# Patient Record
Sex: MALE | Race: WHITE | HISPANIC OR LATINO | Employment: FULL TIME | URBAN - METROPOLITAN AREA
[De-identification: names, ages, dates, MRNs, and addresses within clinical notes are randomized per-mention and may not be internally consistent; named-entity substitution may affect disease eponyms.]

---

## 2023-12-21 ENCOUNTER — HOSPITAL ENCOUNTER (EMERGENCY)
Facility: HOSPITAL | Age: 43
Discharge: HOME | End: 2023-12-21
Admitting: EMERGENCY MEDICINE
Payer: COMMERCIAL

## 2023-12-21 VITALS
DIASTOLIC BLOOD PRESSURE: 83 MMHG | BODY MASS INDEX: 38.5 KG/M2 | RESPIRATION RATE: 20 BRPM | WEIGHT: 300 LBS | HEIGHT: 74 IN | HEART RATE: 89 BPM | TEMPERATURE: 97.1 F | OXYGEN SATURATION: 97 % | SYSTOLIC BLOOD PRESSURE: 165 MMHG

## 2023-12-21 LAB
ALBUMIN SERPL BCG-MCNC: 3.8 G/DL (ref 3.5–5.2)
ALP SERPL-CCNC: 104 U/L (ref 40–150)
ALT SERPL W P-5'-P-CCNC: 18 U/L (ref 0–70)
ANION GAP SERPL CALCULATED.3IONS-SCNC: 13 MMOL/L (ref 7–15)
AST SERPL W P-5'-P-CCNC: 16 U/L (ref 0–45)
BASOPHILS # BLD AUTO: 0 10E3/UL (ref 0–0.2)
BASOPHILS NFR BLD AUTO: 1 %
BILIRUB DIRECT SERPL-MCNC: <0.2 MG/DL (ref 0–0.3)
BILIRUB SERPL-MCNC: 0.4 MG/DL
BUN SERPL-MCNC: 16.1 MG/DL (ref 6–20)
CALCIUM SERPL-MCNC: 8.9 MG/DL (ref 8.6–10)
CHLORIDE SERPL-SCNC: 96 MMOL/L (ref 98–107)
CREAT SERPL-MCNC: 0.67 MG/DL (ref 0.67–1.17)
DEPRECATED HCO3 PLAS-SCNC: 27 MMOL/L (ref 22–29)
EGFRCR SERPLBLD CKD-EPI 2021: >90 ML/MIN/1.73M2
EOSINOPHIL # BLD AUTO: 0.1 10E3/UL (ref 0–0.7)
EOSINOPHIL NFR BLD AUTO: 1 %
ERYTHROCYTE [DISTWIDTH] IN BLOOD BY AUTOMATED COUNT: 12 % (ref 10–15)
GLUCOSE SERPL-MCNC: 281 MG/DL (ref 70–99)
HCT VFR BLD AUTO: 44 % (ref 40–53)
HGB BLD-MCNC: 14.2 G/DL (ref 13.3–17.7)
HOLD SPECIMEN: NORMAL
HOLD SPECIMEN: NORMAL
IMM GRANULOCYTES # BLD: 0 10E3/UL
IMM GRANULOCYTES NFR BLD: 0 %
LIPASE SERPL-CCNC: 17 U/L (ref 13–60)
LYMPHOCYTES # BLD AUTO: 0.9 10E3/UL (ref 0.8–5.3)
LYMPHOCYTES NFR BLD AUTO: 16 %
MAGNESIUM SERPL-MCNC: 1.9 MG/DL (ref 1.7–2.3)
MCH RBC QN AUTO: 28.6 PG (ref 26.5–33)
MCHC RBC AUTO-ENTMCNC: 32.3 G/DL (ref 31.5–36.5)
MCV RBC AUTO: 89 FL (ref 78–100)
MONOCYTES # BLD AUTO: 0.5 10E3/UL (ref 0–1.3)
MONOCYTES NFR BLD AUTO: 9 %
NEUTROPHILS # BLD AUTO: 4.3 10E3/UL (ref 1.6–8.3)
NEUTROPHILS NFR BLD AUTO: 73 %
NRBC # BLD AUTO: 0 10E3/UL
NRBC BLD AUTO-RTO: 0 /100
PLATELET # BLD AUTO: 218 10E3/UL (ref 150–450)
POTASSIUM SERPL-SCNC: 4.1 MMOL/L (ref 3.4–5.3)
PROT SERPL-MCNC: 7 G/DL (ref 6.4–8.3)
RBC # BLD AUTO: 4.97 10E6/UL (ref 4.4–5.9)
SODIUM SERPL-SCNC: 136 MMOL/L (ref 135–145)
WBC # BLD AUTO: 5.9 10E3/UL (ref 4–11)

## 2023-12-21 PROCEDURE — 36415 COLL VENOUS BLD VENIPUNCTURE: CPT | Performed by: EMERGENCY MEDICINE

## 2023-12-21 PROCEDURE — 93005 ELECTROCARDIOGRAM TRACING: CPT

## 2023-12-21 PROCEDURE — 83690 ASSAY OF LIPASE: CPT | Performed by: EMERGENCY MEDICINE

## 2023-12-21 PROCEDURE — 93005 ELECTROCARDIOGRAM TRACING: CPT | Performed by: EMERGENCY MEDICINE

## 2023-12-21 PROCEDURE — 82248 BILIRUBIN DIRECT: CPT | Performed by: EMERGENCY MEDICINE

## 2023-12-21 PROCEDURE — 82374 ASSAY BLOOD CARBON DIOXIDE: CPT | Performed by: EMERGENCY MEDICINE

## 2023-12-21 PROCEDURE — 85025 COMPLETE CBC W/AUTO DIFF WBC: CPT | Performed by: EMERGENCY MEDICINE

## 2023-12-21 PROCEDURE — 99281 EMR DPT VST MAYX REQ PHY/QHP: CPT

## 2023-12-21 PROCEDURE — 83735 ASSAY OF MAGNESIUM: CPT | Performed by: EMERGENCY MEDICINE

## 2023-12-21 PROCEDURE — 82310 ASSAY OF CALCIUM: CPT | Performed by: EMERGENCY MEDICINE

## 2023-12-21 NOTE — ED NOTES
Call placed to pt and SO ( who was in ED with him), to return to ED to have IV removed.  Will give pt 20 minutes to return, then call PD.

## 2023-12-21 NOTE — ED NOTES
Patient called to report that he was at [facility], confirmed with Triage Nurse.  Informed pt that his wallet is here with Security.

## 2023-12-21 NOTE — ED NOTES
Pt called from [facility] waiting room to state that he has left without letting anyone know with this IV in. Writer spoke with triage nurse at [facility] ER letting her know that he has an IV in his arm and to please pull the IV before he leaves there.

## 2023-12-21 NOTE — ED NOTES
Reported by Security/Registration, they watched pt get into car and drive out of parking lot.  Had been talking loudly while in waiting room about going to [facility].  Pt had IV placed while in waiting room.

## 2023-12-22 LAB
ATRIAL RATE - MUSE: 88 BPM
DIASTOLIC BLOOD PRESSURE - MUSE: NORMAL MMHG
INTERPRETATION ECG - MUSE: NORMAL
P AXIS - MUSE: 66 DEGREES
PR INTERVAL - MUSE: 150 MS
QRS DURATION - MUSE: 80 MS
QT - MUSE: 372 MS
QTC - MUSE: 450 MS
R AXIS - MUSE: 54 DEGREES
SYSTOLIC BLOOD PRESSURE - MUSE: NORMAL MMHG
T AXIS - MUSE: 54 DEGREES
VENTRICULAR RATE- MUSE: 88 BPM

## 2024-02-02 ENCOUNTER — HOSPITAL ENCOUNTER (EMERGENCY)
Facility: HOSPITAL | Age: 44
Discharge: HOME | End: 2024-02-02
Payer: COMMERCIAL

## 2024-02-02 VITALS
OXYGEN SATURATION: 97 % | RESPIRATION RATE: 18 BRPM | TEMPERATURE: 98.2 F | HEART RATE: 78 BPM | SYSTOLIC BLOOD PRESSURE: 167 MMHG | DIASTOLIC BLOOD PRESSURE: 77 MMHG

## 2024-02-02 DIAGNOSIS — L03.90 CELLULITIS: ICD-10-CM

## 2024-02-02 DIAGNOSIS — L02.91 ABSCESS: ICD-10-CM

## 2024-02-02 PROCEDURE — 10060 I&D ABSCESS SIMPLE/SINGLE: CPT

## 2024-02-02 PROCEDURE — 250N000013 HC RX MED GY IP 250 OP 250 PS 637

## 2024-02-02 PROCEDURE — 99283 EMERGENCY DEPT VISIT LOW MDM: CPT | Mod: 25

## 2024-02-02 RX ORDER — IBUPROFEN 600 MG/1
600 TABLET, FILM COATED ORAL EVERY 6 HOURS PRN
Qty: 15 TABLET | Refills: 0 | Status: SHIPPED | OUTPATIENT
Start: 2024-02-02

## 2024-02-02 RX ORDER — SULFAMETHOXAZOLE/TRIMETHOPRIM 800-160 MG
1 TABLET ORAL 2 TIMES DAILY
Qty: 14 TABLET | Refills: 0 | Status: SHIPPED | OUTPATIENT
Start: 2024-02-02 | End: 2024-02-09

## 2024-02-02 RX ORDER — IBUPROFEN 600 MG/1
600 TABLET, FILM COATED ORAL ONCE
Status: COMPLETED | OUTPATIENT
Start: 2024-02-02 | End: 2024-02-02

## 2024-02-02 RX ADMIN — IBUPROFEN 600 MG: 600 TABLET ORAL at 12:07

## 2024-02-02 ASSESSMENT — ACTIVITIES OF DAILY LIVING (ADL): ADLS_ACUITY_SCORE: 33

## 2024-02-02 NOTE — ED TRIAGE NOTES
Pt states boil on right posterior scalp.  Pt has red, scabbed area on head.  States having boils drained in past.      Triage Assessment (Adult)       Row Name 02/02/24 1026          Triage Assessment    Airway WDL WDL        Respiratory WDL    Respiratory WDL WDL        Skin Circulation/Temperature WDL    Skin Circulation/Temperature WDL X  abscess to back right side of head        Cardiac WDL    Cardiac WDL WDL        Peripheral/Neurovascular WDL    Peripheral Neurovascular WDL WDL        Cognitive/Neuro/Behavioral WDL    Cognitive/Neuro/Behavioral WDL WDL

## 2024-02-02 NOTE — DISCHARGE INSTRUCTIONS
You are seen in the emergency department today for an abscess on the back of your scalp.  I was able to drain some pus from it although it was not large enough to be packing.  Given the surrounding redness, warmth, and concerns for cellulitis I will prescribe an antibiotic.  I gave you information for primary care provider to get close follow-up for this.  Please return to the emergency department if you develop fevers, significant pain, worsening redness, or any other concerning symptoms.

## 2024-02-03 NOTE — ED PROVIDER NOTES
"Emergency Department Encounter   NAME: Bhargav Head ; AGE: 43 year old male ; YOB: 1980 ; MRN: [id number] ; PCP: Shari Varela   ED PROVIDER: Brenda Goodson PA-C    Evaluation Date & Time:   2024 10:28 AM    CHIEF COMPLAINT:  Abscess      Impression and Plan   Medical Decision Making    Bhargav Head is a 43 year old male who presents for evaluation of an abscess on the back of his scalp.  Reports that it started as a pimple that he was picking at and over the past couple days has been growing and becoming increasingly more tender and painful.  Denies fevers, nausea, vomiting, headaches, shortness of breath, chest pain. Vitals unremarkable, afebrile, not tachycardic. On exam well appearing male in no acute distress.  Nickel to quarter size abscess located on the right occipital region of the skull.  Surrounding erythema and black/brown eschar in the middle. No drainage currently. Tenderness with palpation. Area of fluctuance located just lateral to the eschar that may be drainage.     Consented patient to the I&D. 2 ml of lidocaine 1% were injected into and around the top of the abscess. An 11 blade was used to make an incision just along the lateral border of the eschar. Initially a rush of pus is appreciated but otherwise, mostly blood. Loculations were broken up. The cavity is not deep or complicated enough to require packing. Wound was irrigated until no pus/bleeding more was appreciated. A bandage was placed over the wound. Given how rapidly it accumulated from the original \"boil\" or \"pimple\" to being erythematous and pus filled, will cover for MRSA with bactrim. Given a dose of ibuprofen here for pain felt like pressing on the cyst to  pus out during the procedure but does not report feeling pain or discomfort during the beginning of the procedure. He was discharged in stable condition. All questions answered.    Medical Decision Making  Obtained supplemental " history:Supplemental history obtained?: No  Reviewed external records: External records reviewed?: No  Care impacted by chronic illness:N/A  Care significantly affected by social determinants of health:N/A  Did you consider but not order tests?: Work up considered but not performed and documented in chart, if applicable  Did you interpret images independently?: Independent interpretation of ECG and images noted in documentation, when applicable.  Consultation discussion with other provider:Did you involve another provider (consultant, , pharmacy, etc.)?: No  Discharge. I prescribed additional prescription strength medication(s) as charted. See documentation for any additional details.      ED COURSE:  1100 I met and introduced myself to the patient. I gathered initial history and performed my physical exam. We discussed plan for initial workup.   1145 I&D performed.  1216 We discussed the plan for discharge and the patient is agreeable. Reviewed supportive cares, symptomatic treatment, outpatient follow up, and reasons to return to the Emergency Department. Patient to be discharged by ED RN.           FINAL IMPRESSION:    ICD-10-CM    1. Abscess  L02.91       2. Cellulitis  L03.90           MEDICATIONS GIVEN IN THE EMERGENCY DEPARTMENT:  Medications   ibuprofen (ADVIL/MOTRIN) tablet 600 mg (600 mg Oral $Given 2/2/24 1207)       NEW PRESCRIPTIONS STARTED AT TODAY'S ED VISIT:  Discharge Medication List as of 2/2/2024 12:14 PM        START taking these medications    Details   ibuprofen (ADVIL/MOTRIN) 600 MG tablet Take 1 tablet (600 mg) by mouth every 6 hours as needed for moderate pain, Disp-15 tablet, R-0, Local Print      sulfamethoxazole-trimethoprim (BACTRIM DS) 800-160 MG tablet Take 1 tablet by mouth 2 times daily for 7 days, Disp-14 tablet, R-0, Local Print             HPI   Patient information was obtained from: patient   Use of Intrepreter: N/A     Bhargav Head is a 43 year old male who presents for  evaluation of an abscess on the back of his scalp.  Reports that it started as a pimple that he was picking at and over the past couple days has been growing and becoming increasingly more tender and painful.  History of abscesses on several locations of his body but never on his scalp.  Denies fevers, nausea, vomiting, headaches, shortness of breath, chest pain.       Medical History     Past Medical History:   Diagnosis Date    Diabetes mellitus (H)        No past surgical history on file.    No family history on file.         ibuprofen (ADVIL/MOTRIN) 600 MG tablet  sulfamethoxazole-trimethoprim (BACTRIM DS) 800-160 MG tablet  HYDROcodone-acetaminophen (NORCO) 5-325 MG per tablet  Insulin Aspart (NOVOLOG SC)  insulin glargine (LANTUS) SOLN 100 UNIT/ML  oxyCODONE-acetaminophen (PERCOCET) 5-325 MG per tablet        Physical Exam     First Vitals:  Patient Vitals for the past 24 hrs:   BP Temp Temp src Pulse Resp SpO2   02/02/24 1216 (!) 167/77 -- -- 78 18 97 %   02/02/24 1025 (!) 186/92 98.2  F (36.8  C) Oral 85 16 96 %       PHYSICAL EXAM:   Physical Exam  Constitutional:       General: He is not in acute distress.     Appearance: Normal appearance. He is not ill-appearing.   Musculoskeletal:         General: Swelling and tenderness present.   Skin:     General: Skin is warm.      Findings: Erythema and lesion present. No bruising.      Comments: Quarter size abscess on the posterior right occipital region of the skull and a bald head.  Center actually higher about half centimeter in size.  Wound, tender, erythematous.  Area of fluctuance just lateral to the center eschar.   Neurological:      General: No focal deficit present.      Mental Status: He is alert.      Cranial Nerves: No cranial nerve deficit.      Sensory: No sensory deficit.      Motor: No weakness.   Psychiatric:         Mood and Affect: Mood normal.           Results     LAB:  All pertinent labs reviewed and interpreted  Labs Ordered and Resulted  from Time of ED Arrival to Time of ED Departure - No data to display    RADIOLOGY:  No orders to display     PROCEDURE: Incision and Drainage   INDICATIONS: Localized abscess   PROCEDURE PROVIDER: Brenda Goodson PA-C   SITE: Right posterior scalp   MEDICATION: 2 mLs of 1% Lidocaine without epinephrine   NOTE: The area was prepped with chlorhexidine and draped off in the usual sterile fashion.  Local anesthetic was injected subcutaneously with anesthesia effects demonstrated prior to proceeding.  The area of maximal fluctuance was opened with a # 11 Blade ([place]) using a Single Straight incision to allow for drainage.  The abscess was drained.  The abscess cavity was bluntly explored to separate any loculations. No Packing was placed into the abscess cavity.  A sterile dressing was placed over the area.   COMPLEXITY: Simple    Simple = single, furuncle, paronychia, superficial  Complex = multiple or abscess requiring probing, loculations, packing placement   COMPLICATIONS: Patient tolerated procedure well, without complication          Brenda Goodson PA-C  Emergency Medicine   [facility] EMERGENCY DEPARTMENT       Brenda Goodson PA-C  02/03/24 0821       Brenda Goodson PA-C  02/03/24 0821

## 2024-08-12 ENCOUNTER — HOSPITAL ENCOUNTER (EMERGENCY)
Facility: HOSPITAL | Age: 44
Discharge: HOME | End: 2024-08-12
Attending: EMERGENCY MEDICINE | Admitting: EMERGENCY MEDICINE
Payer: COMMERCIAL

## 2024-08-12 VITALS
DIASTOLIC BLOOD PRESSURE: 91 MMHG | SYSTOLIC BLOOD PRESSURE: 167 MMHG | HEART RATE: 74 BPM | TEMPERATURE: 98.2 F | BODY MASS INDEX: 38.5 KG/M2 | WEIGHT: 300 LBS | HEIGHT: 74 IN | RESPIRATION RATE: 16 BRPM | OXYGEN SATURATION: 99 %

## 2024-08-12 DIAGNOSIS — S02.5XXA CLOSED FRACTURE OF TOOTH, INITIAL ENCOUNTER: ICD-10-CM

## 2024-08-12 DIAGNOSIS — K08.89 PAIN, DENTAL: ICD-10-CM

## 2024-08-12 PROCEDURE — 99283 EMERGENCY DEPT VISIT LOW MDM: CPT

## 2024-08-12 ASSESSMENT — COLUMBIA-SUICIDE SEVERITY RATING SCALE - C-SSRS
2. HAVE YOU ACTUALLY HAD ANY THOUGHTS OF KILLING YOURSELF IN THE PAST MONTH?: NO
1. IN THE PAST MONTH, HAVE YOU WISHED YOU WERE DEAD OR WISHED YOU COULD GO TO SLEEP AND NOT WAKE UP?: NO
6. HAVE YOU EVER DONE ANYTHING, STARTED TO DO ANYTHING, OR PREPARED TO DO ANYTHING TO END YOUR LIFE?: NO

## 2024-08-12 NOTE — Clinical Note
Bhargav Head was seen and treated in our emergency department on 8/12/2024.  He may return to work on 08/13/2024.       If you have any questions or concerns, please don't hesitate to call.      Olinda, Marisela GILLIS, DO

## 2024-08-12 NOTE — DISCHARGE INSTRUCTIONS
Looks like you have a fracture of the lower molar on your left side which is likely causing your pain due to exposed root  No signs of infection now I think it is reasonable to switch her antibiotics  Follow-up with your dentist today as scheduled  Continue Tylenol and ibuprofen for pain, avoid any extremes in temperature in your mouth, I would only use soft foods until your appointment

## 2024-08-12 NOTE — ED PROVIDER NOTES
EMERGENCY DEPARTMENT ENCOUNTER      NAME: Bhargav Head  AGE: 43 year old male  YOB: 1980  MRN: [id number]  EVALUATION DATE & TIME: 8/12/2024  7:16 AM    PCP: Shari Varela    ED PROVIDER: Marisela Prakash DO      Chief Complaint   Patient presents with    Dental Pain       FINAL IMPRESSION:  1. Closed fracture of tooth, initial encounter    2. Pain, dental        ED COURSE & MEDICAL DECISION MAKING:    Pertinent Labs & Imaging studies reviewed. (See chart for details)  7:16 AM I met the patient and performed my initial interview and exam.    43 year old male presents to the Emergency Department for evaluation of dental pain.  Was eating something over the weekend and felt a crack in his tooth.  Patient does have a fracture to his left lower molar.  I cannot obviously see any exposed pulp, however likely source of his pain.  No signs of subluxation.  No signs of dental abscess.  Oropharynx is otherwise clear.  Encourage Tylenol and ibuprofen for pain.  I think it is reasonable to start antibiotics.  Adelso has an appointment with his dentist this afternoon which I encouraged him to keep.    At the conclusion of the encounter I discussed the results of all of the tests and the disposition. The questions were answered. The patient or family acknowledged understanding and was agreeable with the care plan.     Medical Decision Making  Obtained supplemental history:Supplemental history obtained?: No  Reviewed external records: External records reviewed?: No  Care impacted by chronic illness:N/A  Care significantly affected by social determinants of health:N/A  Did you consider but not order tests?: Work up considered but not performed and documented in chart, if applicable  Did you interpret images independently?: Independent interpretation of ECG and images noted in documentation, when applicable.  Consultation discussion with other provider:Did you involve another provider (consultant, , pharmacy,  etc.)?: No  Discharge. I prescribed additional prescription strength medication(s) as charted. N/A.    MEDICATIONS GIVEN IN THE EMERGENCY:  Medications - No data to display    NEW PRESCRIPTIONS STARTED AT TODAY'S ER VISIT  New Prescriptions    AMOXICILLIN-CLAVULANATE (AUGMENTIN) 875-125 MG TABLET    Take 1 tablet by mouth 2 times daily     =================================================================    HPI    Patient information was obtained from: Patient    Use of : N/A     Bhargav Head is a 43 year old male with a pertinent history of previous dental problems who presents to this ED by private car for evaluation of dental pain.    Patient reports that he was eating some gummy bears 2 days ago when he felt his left lower tooth crack causing immediate pain. Pain has been persistent since, though worsened this AM prompting his visit into the ED. Scheduled dental appointment for today at 12:30 PM, but comes in for Abx to get ahead of this possible infection. No fever or difficultly swallowing.    Patient has otherwise been in his normal state of health and denies any other acute medical complaints at this time.    REVIEW OF SYSTEMS   Per HPI    PAST MEDICAL HISTORY:  Past Medical History:   Diagnosis Date    Diabetes mellitus (H)        PAST SURGICAL HISTORY:  No past surgical history on file.    CURRENT MEDICATIONS:    amoxicillin-clavulanate (AUGMENTIN) 875-125 MG tablet  HYDROcodone-acetaminophen (NORCO) 5-325 MG per tablet  ibuprofen (ADVIL/MOTRIN) 600 MG tablet  Insulin Aspart (NOVOLOG SC)  insulin glargine (LANTUS) SOLN 100 UNIT/ML  oxyCODONE-acetaminophen (PERCOCET) 5-325 MG per tablet         ALLERGIES:  Allergies   Allergen Reactions    Compazine        FAMILY HISTORY:  No family history on file.    SOCIAL HISTORY:   Social History     Socioeconomic History    Marital status: Single     Social Determinants of Health     Financial Resource Strain: Declined (6/13/2024)    Received from [employer]  "   Financial Resource Strain     Financial Resource Strain: 99   Food Insecurity: Declined (2024)    Received from [employer]    Food Insecurity     Food: 99   Transportation Needs: Declined (2024)    Received from [employer]    Transportation Needs     Transportation: 99   Physical Activity: Not on File (2023)    Received from [employer]    Physical Activity     Physical Activity: 0   Stress: Not on File (2023)    Received from [employer]    Stress     Stress: 0   Social Connections: Not on File (2023)    Received from [employer]    Social Connections     Social Connections and Isolation: 0   Housing Stability: Declined (2024)    Received from [employer]    Housing Stability     Housin       VITALS:  BP (!) 182/97   Pulse 75   Temp 98.2  F (36.8  C)   Resp 12   Ht 1.88 m (6' 2\")   Wt 136.1 kg (300 lb)   SpO2 99%   BMI 38.52 kg/m      PHYSICAL EXAM    Physical Exam  Vitals and nursing note reviewed.   Constitutional:       General: He is not in acute distress.     Appearance: Normal appearance.   HENT:      Head: Normocephalic and atraumatic.      Comments: Fracture to tooth #18, no obvious exposed pulp     Mouth/Throat:      Mouth: No angioedema.      Dentition: No gingival swelling, dental abscesses or gum lesions.      Pharynx: Oropharynx is clear. Uvula midline.   Eyes:      Pupils: Pupils are equal, round, and reactive to light.   Cardiovascular:      Rate and Rhythm: Normal rate and regular rhythm.   Pulmonary:      Effort: Pulmonary effort is normal.   Abdominal:      General: Abdomen is flat.   Musculoskeletal:         General: Normal range of motion.   Skin:     General: Skin is warm and dry.   Neurological:      General: No focal deficit present.      Mental Status: He is alert.      Gait: Gait normal.        I, Martin De La Cruz, am serving as a scribe to document services personally performed by Dr. Marisela Prakash based on my observation and the provider's statements to me. I, " Marisela Prakash DO attest that Martin De La Cruz is acting in a scribe capacity, has observed my performance of the services and has documented them in accordance with my direction.    Marisela Prakash DO  Emergency Medicine  [facility] EMERGENCY DEPARTMENT  [address] [place] [id number]  [phone]  Dept: [phone]       Marisela Prakash DO  08/12/24 0754

## 2024-08-12 NOTE — ED TRIAGE NOTES
Pt has had lower left tooth pain and swelling since Saturday. Pain 7/10 after advil 600mg at 1830. Has an appt at 1230 for the dentist but thinks he needs some antibiotics.

## 2024-08-27 ENCOUNTER — HOSPITAL ENCOUNTER (EMERGENCY)
Facility: HOSPITAL | Age: 44
Discharge: HOME | End: 2024-08-27
Attending: STUDENT IN AN ORGANIZED HEALTH CARE EDUCATION/TRAINING PROGRAM | Admitting: STUDENT IN AN ORGANIZED HEALTH CARE EDUCATION/TRAINING PROGRAM
Payer: COMMERCIAL

## 2024-08-27 VITALS
BODY MASS INDEX: 40.43 KG/M2 | HEART RATE: 82 BPM | SYSTOLIC BLOOD PRESSURE: 152 MMHG | TEMPERATURE: 98 F | DIASTOLIC BLOOD PRESSURE: 79 MMHG | OXYGEN SATURATION: 98 % | HEIGHT: 74 IN | RESPIRATION RATE: 16 BRPM | WEIGHT: 315 LBS

## 2024-08-27 DIAGNOSIS — E11.40 TYPE 2 DIABETES MELLITUS WITH DIABETIC NEUROPATHY, WITHOUT LONG-TERM CURRENT USE OF INSULIN (H): ICD-10-CM

## 2024-08-27 LAB
ANION GAP SERPL CALCULATED.3IONS-SCNC: 11 MMOL/L (ref 7–15)
BASOPHILS # BLD AUTO: 0.1 10E3/UL (ref 0–0.2)
BASOPHILS NFR BLD AUTO: 1 %
BUN SERPL-MCNC: 20.8 MG/DL (ref 6–20)
CALCIUM SERPL-MCNC: 9.1 MG/DL (ref 8.8–10.4)
CHLORIDE SERPL-SCNC: 101 MMOL/L (ref 98–107)
CREAT SERPL-MCNC: 0.7 MG/DL (ref 0.67–1.17)
EGFRCR SERPLBLD CKD-EPI 2021: >90 ML/MIN/1.73M2
EOSINOPHIL # BLD AUTO: 0.2 10E3/UL (ref 0–0.7)
EOSINOPHIL NFR BLD AUTO: 3 %
ERYTHROCYTE [DISTWIDTH] IN BLOOD BY AUTOMATED COUNT: 12.1 % (ref 10–15)
GLUCOSE SERPL-MCNC: 199 MG/DL (ref 70–99)
HCO3 SERPL-SCNC: 26 MMOL/L (ref 22–29)
HCT VFR BLD AUTO: 42.5 % (ref 40–53)
HGB BLD-MCNC: 14.3 G/DL (ref 13.3–17.7)
IMM GRANULOCYTES # BLD: 0 10E3/UL
IMM GRANULOCYTES NFR BLD: 0 %
LYMPHOCYTES # BLD AUTO: 3.2 10E3/UL (ref 0.8–5.3)
LYMPHOCYTES NFR BLD AUTO: 40 %
MAGNESIUM SERPL-MCNC: 2.1 MG/DL (ref 1.7–2.3)
MCH RBC QN AUTO: 29.1 PG (ref 26.5–33)
MCHC RBC AUTO-ENTMCNC: 33.6 G/DL (ref 31.5–36.5)
MCV RBC AUTO: 86 FL (ref 78–100)
MONOCYTES # BLD AUTO: 0.5 10E3/UL (ref 0–1.3)
MONOCYTES NFR BLD AUTO: 7 %
NEUTROPHILS # BLD AUTO: 4 10E3/UL (ref 1.6–8.3)
NEUTROPHILS NFR BLD AUTO: 49 %
NRBC # BLD AUTO: 0 10E3/UL
NRBC BLD AUTO-RTO: 0 /100
PLATELET # BLD AUTO: 272 10E3/UL (ref 150–450)
POTASSIUM SERPL-SCNC: 3.7 MMOL/L (ref 3.4–5.3)
RBC # BLD AUTO: 4.92 10E6/UL (ref 4.4–5.9)
SODIUM SERPL-SCNC: 138 MMOL/L (ref 135–145)
TROPONIN T SERPL HS-MCNC: 10 NG/L
WBC # BLD AUTO: 8 10E3/UL (ref 4–11)

## 2024-08-27 PROCEDURE — 84484 ASSAY OF TROPONIN QUANT: CPT | Performed by: STUDENT IN AN ORGANIZED HEALTH CARE EDUCATION/TRAINING PROGRAM

## 2024-08-27 PROCEDURE — 83735 ASSAY OF MAGNESIUM: CPT | Performed by: STUDENT IN AN ORGANIZED HEALTH CARE EDUCATION/TRAINING PROGRAM

## 2024-08-27 PROCEDURE — 36415 COLL VENOUS BLD VENIPUNCTURE: CPT | Performed by: STUDENT IN AN ORGANIZED HEALTH CARE EDUCATION/TRAINING PROGRAM

## 2024-08-27 PROCEDURE — 80048 BASIC METABOLIC PNL TOTAL CA: CPT | Performed by: STUDENT IN AN ORGANIZED HEALTH CARE EDUCATION/TRAINING PROGRAM

## 2024-08-27 PROCEDURE — 250N000013 HC RX MED GY IP 250 OP 250 PS 637: Performed by: STUDENT IN AN ORGANIZED HEALTH CARE EDUCATION/TRAINING PROGRAM

## 2024-08-27 PROCEDURE — 93005 ELECTROCARDIOGRAM TRACING: CPT | Performed by: STUDENT IN AN ORGANIZED HEALTH CARE EDUCATION/TRAINING PROGRAM

## 2024-08-27 PROCEDURE — 99284 EMERGENCY DEPT VISIT MOD MDM: CPT | Mod: 25 | Performed by: STUDENT IN AN ORGANIZED HEALTH CARE EDUCATION/TRAINING PROGRAM

## 2024-08-27 PROCEDURE — 85025 COMPLETE CBC W/AUTO DIFF WBC: CPT | Performed by: STUDENT IN AN ORGANIZED HEALTH CARE EDUCATION/TRAINING PROGRAM

## 2024-08-27 RX ORDER — ACETAMINOPHEN 325 MG/1
975 TABLET ORAL ONCE
Status: COMPLETED | OUTPATIENT
Start: 2024-08-27 | End: 2024-08-27

## 2024-08-27 RX ORDER — OXYCODONE HYDROCHLORIDE 5 MG/1
5 TABLET ORAL ONCE
Status: COMPLETED | OUTPATIENT
Start: 2024-08-27 | End: 2024-08-27

## 2024-08-27 RX ORDER — DULOXETIN HYDROCHLORIDE 60 MG/1
60 CAPSULE, DELAYED RELEASE ORAL DAILY
Qty: 30 CAPSULE | Refills: 0 | Status: SHIPPED | OUTPATIENT
Start: 2024-08-27

## 2024-08-27 RX ADMIN — ACETAMINOPHEN 975 MG: 325 TABLET ORAL at 08:43

## 2024-08-27 RX ADMIN — OXYCODONE HYDROCHLORIDE 5 MG: 5 TABLET ORAL at 08:43

## 2024-08-27 ASSESSMENT — COLUMBIA-SUICIDE SEVERITY RATING SCALE - C-SSRS
2. HAVE YOU ACTUALLY HAD ANY THOUGHTS OF KILLING YOURSELF IN THE PAST MONTH?: NO
6. HAVE YOU EVER DONE ANYTHING, STARTED TO DO ANYTHING, OR PREPARED TO DO ANYTHING TO END YOUR LIFE?: NO
1. IN THE PAST MONTH, HAVE YOU WISHED YOU WERE DEAD OR WISHED YOU COULD GO TO SLEEP AND NOT WAKE UP?: NO

## 2024-08-27 ASSESSMENT — ACTIVITIES OF DAILY LIVING (ADL)
ADLS_ACUITY_SCORE: 35
ADLS_ACUITY_SCORE: 33
ADLS_ACUITY_SCORE: 35
ADLS_ACUITY_SCORE: 35

## 2024-08-27 NOTE — Clinical Note
Bhargav Head was seen and treated in our emergency department on 8/27/2024.  He may return to work on 08/28/2024.       If you have any questions or concerns, please don't hesitate to call.      Hussain Martinez MD

## 2024-08-27 NOTE — ED TRIAGE NOTES
Patient states he has neuropathy from diabetes.  He says that yesterday and today is has been very bad in his feet and hands despite the use of his gabapentin.

## 2024-08-27 NOTE — ED PROVIDER NOTES
EMERGENCY DEPARTMENT ENCOUNTER      NAME: Bhargav Head  AGE: 43 year old male  YOB: 1980  MRN: [id number]  EVALUATION DATE & TIME: 8/27/2024  7:37 AM    PCP: No Ref-Primary, Physician    ED PROVIDER: Hussain Martinez MD      Chief Complaint   Patient presents with    hand and feet pain         FINAL IMPRESSION:  1. Type 2 diabetes mellitus with diabetic neuropathy, without long-term current use of insulin (H)          ED COURSE & MEDICAL DECISION MAKING:    Pertinent Labs & Imaging studies reviewed. (See chart for details)  43 year old male presents to the Emergency Department for evaluation of neuropathy pain    ED Course as of 08/27/24 1108   Tue Aug 27, 2024   0832 Patient is a 43-year-old male with history of diabetes and peripheral neuropathy who presents to the emergency department for evaluation of pain in bilateral hands and feet.  States the pain has been worse over the past day or 2 compared to his typical neuropathy.  Says he does have some right-sided chest pain that radiates down the right arm as well.  No associated shortness of breath.  No nausea or vomiting.  No fevers.  No recent injuries or falls.  Has been taking his gabapentin as prescribed at home without any relief of the pain.  Also took Motrin at home without any relief of the pain.  Has a continuous blood glucose monitor fell off a few days ago and he has not had a chance to replace this yet but his last blood sugars were in the mid 100s.  He has continued to use his long-acting and short acting insulin.    Exam patient is hypertensive but otherwise hemodynamically stable and afebrile.  Saturating well on room air.  Lungs are clear without wheezes or rails.  Heart is regular in rate and rhythm.  Strong radial pulses bilaterally.  Warm and well-perfused extremities.  Intact sensation to touch in bilateral upper and lower extremities and strong  strength.  No focal bony tenderness or pain with palpation of the chest  ulises.    Suspect probable exacerbation of peripheral neuropathy related to his diabetes but given some right-sided chest pain will obtain EKG and troponin as well as basic metabolic panel to evaluate for significant underlying metabolic derangements.   1048 EKG shows a sinus rhythm at 80 beats a minute, normal axis, normal ND, QRS and QTc durations, no ST elevations or acute ischemic T wave changes.  QTc within normal limits     Labs reviewed and interpreted myself.  CBC shows normal white count and hemoglobin.  No significant metabolic derangement.  Magnesium within normal limits.  Troponin reassuring typical presentation of ACS.    Patient really have any improvement with oxycodone here in the emergency department and I do not think long-term opioids will be beneficial for his neuropathy.  He is already on 600 mg of gabapentin 4 times daily.  Could increase his dose a little bit but I will leave this up to his primary care doctor.  Will trial addition of duloxetine to see if this helps with this pain over the next coming days to weeks.  Otherwise patient safe for discharge home.     8:22 AM I met and evaluated the patient.   10:57 AM I checked on the patient and updated on lab results. Discussed plans for discharge and patient is agreeable. Will be discharged by RN.     Medical Decision Making  Obtained supplemental history:Supplemental history obtained?: Family Member/Significant Other  Reviewed external records: External records reviewed?: No  Care impacted by chronic illness:Diabetes  Care significantly affected by social determinants of health:N/A  Did you consider but not order tests?: Work up considered but not performed and documented in chart, if applicable  Did you interpret images independently?: Independent interpretation of ECG and images noted in documentation, when applicable.  Consultation discussion with other provider:Did you involve another provider (consultant, MH, pharmacy, etc.)?:  No  Discharge. I prescribed additional prescription strength medication(s) as charted. See documentation for any additional details.          At the conclusion of the encounter I discussed the results of all of the tests and the disposition. The questions were answered. The patient or family acknowledged understanding and was agreeable with the care plan.     0 minutes of critical care time     MEDICATIONS GIVEN IN THE EMERGENCY:  Medications   oxyCODONE (ROXICODONE) tablet 5 mg (5 mg Oral $Given 8/27/24 0843)   acetaminophen (TYLENOL) tablet 975 mg (975 mg Oral $Given 8/27/24 0843)       NEW PRESCRIPTIONS STARTED AT TODAY'S ER VISIT  New Prescriptions    DULOXETINE (CYMBALTA) 60 MG CAPSULE    Take 1 capsule (60 mg) by mouth daily.          =================================================================    HPI    Patient information was obtained from: the patient and the patient's spouse    Use of : N/A       Bhargav Head is a 43 year old male with a pertinent history of type II diabetes with neuropathy, gastroesophageal reflux disease and hypertension who presents to this ED by private vehicle for evaluation of upper and lower extremity pain.     Patient reports he was at work last night when he felt the development of pain similar to his history of neuropathy.  He describes the pain to start in his chest and radiate down both arms to his finger tips as well as his bilateral feet. This led him to take x6 gabapentin 600 mg and 1.5 Seroquel last night without any relief, making note he was unable to sleep last night due to the pain. Also utilized ibuprofen ~6 AM without relief. Endorses a similar past history of neuropathy flares, noting this to be his 3rd severe episode. Confirms to be taking both the long acting (50 mg at night) and the fast acting (18 mg before meals) insulin, making note he lost his glucometer ~5 days ago with a last reading of 182. Denies ay recent fall or injury.     No fevers,  "chest pain, abdominal pain, nausea, and vomiting.     REVIEW OF SYSTEMS   Refer to the HPI    PAST MEDICAL HISTORY:  Past Medical History:   Diagnosis Date    Diabetes mellitus (H)        PAST SURGICAL HISTORY:  No past surgical history on file.        CURRENT MEDICATIONS:    DULoxetine (CYMBALTA) 60 MG capsule  amoxicillin-clavulanate (AUGMENTIN) 875-125 MG tablet  HYDROcodone-acetaminophen (NORCO) 5-325 MG per tablet  ibuprofen (ADVIL/MOTRIN) 600 MG tablet  Insulin Aspart (NOVOLOG SC)  insulin glargine (LANTUS) SOLN 100 UNIT/ML  oxyCODONE-acetaminophen (PERCOCET) 5-325 MG per tablet        ALLERGIES:  Allergies   Allergen Reactions    Compazine        FAMILY HISTORY:  No family history on file.    SOCIAL HISTORY:   Social History     Socioeconomic History    Marital status: Single     Social Determinants of Health     Financial Resource Strain: Declined (2024)    Received from [facility]    Financial Resource Strain     Financial Resource Strain: 99   Food Insecurity: Declined (2024)    Received from [facility]    Food Insecurity     Food: 99   Transportation Needs: Declined (2024)    Received from [facility]    Transportation Needs     Transportation: 99   Physical Activity: Not on File (2023)    Received from [facility]    Physical Activity     Physical Activity: 0   Stress: Not on File (2023)    [facility] from [facility]    Stress     Stress: 0   Social Connections: Not on File (2023)    Received from [facility]    Social Connections     Social Connections and Isolation: 0   Housing Stability: Declined (2024)    Received from [facility]    Housing Stability     Housin       VITALS:  BP (!) 152/79   Pulse 82   Temp 98  F (36.7  C) (Oral)   Resp 16   Ht 1.88 m (6' 2\")   Wt 149.7 kg (330 lb)   SpO2 98%   BMI 42.37 kg/m      PHYSICAL EXAM    Constitutional: Well developed, Well nourished, NAD,  HENT: Normocephalic, Atraumatic,  mucous membranes moist,   Neck- trachea midline, No " stridor.   Eyes:EOMI, Conjunctiva normal, No discharge.   Respiratory: Normal breath sounds, No respiratory distress, No wheezing.   Cardiovascular: Normal heart rate, Regular rhythm, No murmurs.   Abdominal: Soft, No tenderness, No rebound or guarding.     Musculoskeletal: no deformity or malalignment   Integument: Warm, Dry, No erythema,    Neurologic: Alert & oriented x 3, symmetric strength in upper lower extremities bilaterally, intact sensation to touch.  Psychiatric: Affect normal, Cooperative.     LAB:  All pertinent labs reviewed and interpreted.  Results for orders placed or performed during the hospital encounter of 08/27/24   Basic metabolic panel   Result Value Ref Range    Sodium 138 135 - 145 mmol/L    Potassium 3.7 3.4 - 5.3 mmol/L    Chloride 101 98 - 107 mmol/L    Carbon Dioxide (CO2) 26 22 - 29 mmol/L    Anion Gap 11 7 - 15 mmol/L    Urea Nitrogen 20.8 (H) 6.0 - 20.0 mg/dL    Creatinine 0.70 0.67 - 1.17 mg/dL    GFR Estimate >90 >60 mL/min/1.73m2    Calcium 9.1 8.8 - 10.4 mg/dL    Glucose 199 (H) 70 - 99 mg/dL   Result Value Ref Range    Troponin T, High Sensitivity 10 <=22 ng/L   Result Value Ref Range    Magnesium 2.1 1.7 - 2.3 mg/dL   CBC with platelets and differential   Result Value Ref Range    WBC Count 8.0 4.0 - 11.0 10e3/uL    RBC Count 4.92 4.40 - 5.90 10e6/uL    Hemoglobin 14.3 13.3 - 17.7 g/dL    Hematocrit 42.5 40.0 - 53.0 %    MCV 86 78 - 100 fL    MCH 29.1 26.5 - 33.0 pg    MCHC 33.6 31.5 - 36.5 g/dL    RDW 12.1 10.0 - 15.0 %    Platelet Count 272 150 - 450 10e3/uL    % Neutrophils 49 %    % Lymphocytes 40 %    % Monocytes 7 %    % Eosinophils 3 %    % Basophils 1 %    % Immature Granulocytes 0 %    NRBCs per 100 WBC 0 <1 /100    Absolute Neutrophils 4.0 1.6 - 8.3 10e3/uL    Absolute Lymphocytes 3.2 0.8 - 5.3 10e3/uL    Absolute Monocytes 0.5 0.0 - 1.3 10e3/uL    Absolute Eosinophils 0.2 0.0 - 0.7 10e3/uL    Absolute Basophils 0.1 0.0 - 0.2 10e3/uL    Absolute Immature Granulocytes  0.0 <=0.4 10e3/uL    Absolute NRBCs 0.0 10e3/uL     EKG:    Performed at:     Impression: EKG shows a sinus rhythm at 80 beats a minute, normal axis, normal [place], QRS and QTc durations, no ST elevations or acute ischemic T wave changes.  QTc within normal limits        I have independently reviewed and interpreted the EKG(s) documented above.    PROCEDURES:   None      [facility] Documentation:   CMS Diagnoses:              I, Gregg Martínez, am serving as a scribe to document services personally performed by Hussain Martinez MD based on my observation and the provider's statements to me. I, Hussain Martinez MD, attest that Gregg Martínez is acting in a scribe capacity, has observed my performance of the services and has documented them in accordance with my direction.    Hussain Martinez MD  [facility] EMERGENCY DEPARTMENT  [address] [place] [id number]  [phone]      Hussain Martinez MD  08/27/24 4325

## 2024-08-27 NOTE — DISCHARGE INSTRUCTIONS
Your workup in the emergency room today did not show any dangerous abnormalities in your blood work or your heart workup.  I think this is related to a flare of your diabetic neuropathy.  I would continue to take your gabapentin but we will try adding on a new medication called duloxetine to see if this helps with your pain over the next several days to several weeks.  I would recommend following up with your primary care doctor to see if additional medication adjustments could be made to help improve your pain in the long-term.

## 2024-09-01 LAB
ATRIAL RATE - MUSE: 80 BPM
DIASTOLIC BLOOD PRESSURE - MUSE: NORMAL MMHG
INTERPRETATION ECG - MUSE: NORMAL
P AXIS - MUSE: 37 DEGREES
PR INTERVAL - MUSE: 132 MS
QRS DURATION - MUSE: 82 MS
QT - MUSE: 362 MS
QTC - MUSE: 417 MS
R AXIS - MUSE: 10 DEGREES
SYSTOLIC BLOOD PRESSURE - MUSE: NORMAL MMHG
T AXIS - MUSE: 58 DEGREES
VENTRICULAR RATE- MUSE: 80 BPM

## 2024-10-21 ENCOUNTER — APPOINTMENT (OUTPATIENT)
Dept: CT IMAGING | Facility: HOSPITAL | Age: 44
End: 2024-10-21
Attending: EMERGENCY MEDICINE
Payer: COMMERCIAL

## 2024-10-21 ENCOUNTER — HOSPITAL ENCOUNTER (EMERGENCY)
Facility: HOSPITAL | Age: 44
Discharge: HOME | End: 2024-10-21
Admitting: EMERGENCY MEDICINE
Payer: COMMERCIAL

## 2024-10-21 VITALS
DIASTOLIC BLOOD PRESSURE: 89 MMHG | RESPIRATION RATE: 18 BRPM | OXYGEN SATURATION: 95 % | TEMPERATURE: 97.8 F | HEIGHT: 74 IN | WEIGHT: 315 LBS | HEART RATE: 80 BPM | BODY MASS INDEX: 40.43 KG/M2 | SYSTOLIC BLOOD PRESSURE: 177 MMHG

## 2024-10-21 DIAGNOSIS — K21.9 ACID REFLUX: ICD-10-CM

## 2024-10-21 DIAGNOSIS — K59.00 CONSTIPATION: ICD-10-CM

## 2024-10-21 DIAGNOSIS — E11.65 POORLY CONTROLLED DIABETES MELLITUS (H): ICD-10-CM

## 2024-10-21 LAB
ALBUMIN SERPL BCG-MCNC: 4.4 G/DL (ref 3.5–5.2)
ALP SERPL-CCNC: 115 U/L (ref 40–150)
ALT SERPL W P-5'-P-CCNC: 23 U/L (ref 0–70)
ANION GAP SERPL CALCULATED.3IONS-SCNC: 11 MMOL/L (ref 7–15)
AST SERPL W P-5'-P-CCNC: 16 U/L (ref 0–45)
BASOPHILS # BLD AUTO: 0.1 10E3/UL (ref 0–0.2)
BASOPHILS NFR BLD AUTO: 1 %
BILIRUB SERPL-MCNC: 0.6 MG/DL
BUN SERPL-MCNC: 17.7 MG/DL (ref 6–20)
CALCIUM SERPL-MCNC: 9.5 MG/DL (ref 8.8–10.4)
CHLORIDE SERPL-SCNC: 97 MMOL/L (ref 98–107)
CREAT SERPL-MCNC: 0.9 MG/DL (ref 0.67–1.17)
EGFRCR SERPLBLD CKD-EPI 2021: >90 ML/MIN/1.73M2
EOSINOPHIL # BLD AUTO: 0.1 10E3/UL (ref 0–0.7)
EOSINOPHIL NFR BLD AUTO: 1 %
ERYTHROCYTE [DISTWIDTH] IN BLOOD BY AUTOMATED COUNT: 12.1 % (ref 10–15)
GLUCOSE SERPL-MCNC: 279 MG/DL (ref 70–99)
HCO3 SERPL-SCNC: 28 MMOL/L (ref 22–29)
HCT VFR BLD AUTO: 51 % (ref 40–53)
HGB BLD-MCNC: 17.2 G/DL (ref 13.3–17.7)
HOLD SPECIMEN: NORMAL
IMM GRANULOCYTES # BLD: 0 10E3/UL
IMM GRANULOCYTES NFR BLD: 0 %
LIPASE SERPL-CCNC: 12 U/L (ref 13–60)
LYMPHOCYTES # BLD AUTO: 1.3 10E3/UL (ref 0.8–5.3)
LYMPHOCYTES NFR BLD AUTO: 15 %
MAGNESIUM SERPL-MCNC: 1.9 MG/DL (ref 1.7–2.3)
MCH RBC QN AUTO: 28.8 PG (ref 26.5–33)
MCHC RBC AUTO-ENTMCNC: 33.7 G/DL (ref 31.5–36.5)
MCV RBC AUTO: 85 FL (ref 78–100)
MONOCYTES # BLD AUTO: 0.5 10E3/UL (ref 0–1.3)
MONOCYTES NFR BLD AUTO: 6 %
NEUTROPHILS # BLD AUTO: 7 10E3/UL (ref 1.6–8.3)
NEUTROPHILS NFR BLD AUTO: 77 %
NRBC # BLD AUTO: 0 10E3/UL
NRBC BLD AUTO-RTO: 0 /100
PLATELET # BLD AUTO: 271 10E3/UL (ref 150–450)
POTASSIUM SERPL-SCNC: 3.5 MMOL/L (ref 3.4–5.3)
PROT SERPL-MCNC: 7.8 G/DL (ref 6.4–8.3)
RBC # BLD AUTO: 5.97 10E6/UL (ref 4.4–5.9)
SODIUM SERPL-SCNC: 136 MMOL/L (ref 135–145)
TROPONIN T SERPL HS-MCNC: 8 NG/L
TROPONIN T SERPL HS-MCNC: 9 NG/L
WBC # BLD AUTO: 9.1 10E3/UL (ref 4–11)

## 2024-10-21 PROCEDURE — 85004 AUTOMATED DIFF WBC COUNT: CPT | Performed by: EMERGENCY MEDICINE

## 2024-10-21 PROCEDURE — 83735 ASSAY OF MAGNESIUM: CPT | Performed by: EMERGENCY MEDICINE

## 2024-10-21 PROCEDURE — 84484 ASSAY OF TROPONIN QUANT: CPT | Performed by: EMERGENCY MEDICINE

## 2024-10-21 PROCEDURE — 83690 ASSAY OF LIPASE: CPT | Performed by: EMERGENCY MEDICINE

## 2024-10-21 PROCEDURE — 250N000011 HC RX IP 250 OP 636: Performed by: EMERGENCY MEDICINE

## 2024-10-21 PROCEDURE — 74177 CT ABD & PELVIS W/CONTRAST: CPT

## 2024-10-21 PROCEDURE — 80053 COMPREHEN METABOLIC PANEL: CPT | Performed by: EMERGENCY MEDICINE

## 2024-10-21 PROCEDURE — 36415 COLL VENOUS BLD VENIPUNCTURE: CPT | Performed by: STUDENT IN AN ORGANIZED HEALTH CARE EDUCATION/TRAINING PROGRAM

## 2024-10-21 PROCEDURE — 36415 COLL VENOUS BLD VENIPUNCTURE: CPT | Performed by: EMERGENCY MEDICINE

## 2024-10-21 PROCEDURE — 93005 ELECTROCARDIOGRAM TRACING: CPT | Performed by: EMERGENCY MEDICINE

## 2024-10-21 PROCEDURE — 96375 TX/PRO/DX INJ NEW DRUG ADDON: CPT

## 2024-10-21 PROCEDURE — 96374 THER/PROPH/DIAG INJ IV PUSH: CPT | Mod: 59

## 2024-10-21 PROCEDURE — 99285 EMERGENCY DEPT VISIT HI MDM: CPT | Mod: 25

## 2024-10-21 RX ORDER — FAMOTIDINE 20 MG/1
20 TABLET, FILM COATED ORAL 2 TIMES DAILY
Qty: 60 TABLET | Refills: 0 | Status: SHIPPED | OUTPATIENT
Start: 2024-10-21 | End: 2024-11-20

## 2024-10-21 RX ORDER — ONDANSETRON 2 MG/ML
4 INJECTION INTRAMUSCULAR; INTRAVENOUS ONCE
Status: COMPLETED | OUTPATIENT
Start: 2024-10-21 | End: 2024-10-21

## 2024-10-21 RX ORDER — ONDANSETRON 4 MG/1
4 TABLET, ORALLY DISINTEGRATING ORAL EVERY 8 HOURS PRN
Qty: 12 TABLET | Refills: 0 | Status: SHIPPED | OUTPATIENT
Start: 2024-10-21 | End: 2024-10-24

## 2024-10-21 RX ORDER — IOPAMIDOL 755 MG/ML
90 INJECTION, SOLUTION INTRAVASCULAR ONCE
Status: COMPLETED | OUTPATIENT
Start: 2024-10-21 | End: 2024-10-21

## 2024-10-21 RX ADMIN — ONDANSETRON 4 MG: 2 INJECTION INTRAMUSCULAR; INTRAVENOUS at 14:50

## 2024-10-21 RX ADMIN — FAMOTIDINE 20 MG: 10 INJECTION, SOLUTION INTRAVENOUS at 16:50

## 2024-10-21 RX ADMIN — IOPAMIDOL 90 ML: 755 INJECTION, SOLUTION INTRAVENOUS at 14:55

## 2024-10-21 ASSESSMENT — ENCOUNTER SYMPTOMS
CONSTIPATION: 1
VOMITING: 0
ABDOMINAL PAIN: 1
NAUSEA: 1
CHILLS: 0
FEVER: 0
SHORTNESS OF BREATH: 0
BLOOD IN STOOL: 0

## 2024-10-21 ASSESSMENT — ACTIVITIES OF DAILY LIVING (ADL)
ADLS_ACUITY_SCORE: 35
ADLS_ACUITY_SCORE: 33
ADLS_ACUITY_SCORE: 35

## 2024-10-21 NOTE — DISCHARGE INSTRUCTIONS
You were seen here today for evaluation of nausea.  Your workup today is reassuring without evidence of infection, liver or kidney dysfunction, pancreatitis, or problems with your heart.  Your CT scan was unremarkable without an obvious explanation for your pain.    I believe the pain you are having is due to acid reflux.  I will prescribe you Pepcid to take twice daily to help with this, it may take a few weeks to fully kick in so please continue taking it even if you feel like it is not helping.  Take Zofran every 8 hours as needed for any severe nausea but this can actually worsen constipation.    For constipation, take MiraLAX daily until having soft, easy to pass bowel movements.  I sent a prescription for new glucometer and strips to your pharmacy.    Follow-up with your primary care provider for recheck as soon as possible.  Return here for any new or worsening symptoms including severe pain, fever, persistent vomiting, black or bloody stools, chest pain, difficulty breathing, or any other symptoms that concern you.

## 2024-10-21 NOTE — ED TRIAGE NOTES
Patient presents here with vomiting and nausea that has persisted over the past 3-4 days.     Triage Assessment (Adult)       Row Name 10/21/24 9754          Triage Assessment    Airway WDL WDL        Respiratory WDL    Respiratory WDL WDL        Skin Circulation/Temperature WDL    Skin Circulation/Temperature WDL WDL        Cardiac WDL    Cardiac WDL WDL        Peripheral/Neurovascular WDL    Peripheral Neurovascular WDL WDL        Cognitive/Neuro/Behavioral WDL    Cognitive/Neuro/Behavioral WDL WDL

## 2024-10-21 NOTE — ED PROVIDER NOTES
EMERGENCY DEPARTMENT ENCOUNTER      NAME: Bhargav Head  AGE: 43 year old male  YOB: 1980  MRN: [id number]  EVALUATION DATE & TIME: No admission date for patient encounter.    PCP: No Ref-Primary, Physician    ED PROVIDER: Abbie Russell PA-C      Chief Complaint   Patient presents with    Nausea & Vomiting         FINAL IMPRESSION:  1. Acid reflux    2. Constipation    3. Poorly controlled diabetes mellitus (H)          ED COURSE & MEDICAL DECISION MAKING:    Pertinent Labs & Imaging studies reviewed. (See chart for details)    43 year old male presents to the Emergency Department for evaluation of abdominal pain, nausea, and vomiting.    Physical exam is remarkable for an uncomfortable appearing male.  Heart and lung sounds are clear diffusely throughout.  Abdomen is soft and nontender.  Vital signs remarkable for hypertension, otherwise stable and he is afebrile.    CBC is unremarkable with no leukocytosis or anemia.  CMP is unremarkable with no significant electrolyte derangements, normal liver and kidney function. He is hyperglycemic with glucose of 279, no anion gap noted and CO2 noted to be normal. Lipase within normal limits. Magnesium within normal limits. Troponin within male reference range at 9, two hour delta troponin is stable at 8. EKG without acute ischemic changes. CT of the abdomen is unremarkable.     The patient was given zofran and pepcid here with improvement in his symptoms. I do not think any further emergent labs or imaging are indicated at this time. The patient is overall well appearing here and hemodynamically stable. His lab work is reassuring without evidence of liver/kidney dysfunction or biliary obstruction. CT reassuring without obstruction or perforation resulting from his constipation. Low risk for cardiac etiology with two negative troponins and non-ischemic EKG. He is hyperglycemic here without evidence of DKA. I suspect his pain is secondary to acid reflux,  prescriptions for zofran and pepcid provided for treatment. I also sent a prescription for a glucometer and testing strips to his pharmacy as his diabetic control is not optimal. Constipation could be related to recent starting of Suboxone, recommend miralax for that. Advised follow up with his primary care provider for recheck as soon as possible and return here for any new or worsening symptoms. The patient is agreeable with this treatment plan and verbalized understanding.     Medical Decision Making  Obtained supplemental history:Supplemental history obtained?: No  Reviewed external records: External records reviewed?: Outpatient Record: Clinic visit for DM2 on 09/20/24  Care impacted by chronic illness:Alcohol and/or Drug Abuse or Dependence, Diabetes, and Hypertension  Care significantly affected by social determinants of health:Access to Affordable Health Care  Did you consider but not order tests?: Work up considered but not performed and documented in chart, if applicable  Did you interpret images independently?: Independent interpretation of ECG and images noted in documentation, when applicable.  Consultation discussion with other provider:Did you involve another provider (consultant, MH, pharmacy, etc.)?: No  Discharge. I prescribed additional prescription strength medication(s) as charted. I considered admission, but ultimately discharged patient after reassuring labs and imaging.  Not Applicable      ED Course   12:40 PM Performed my initial history and physical exam. Discussed workup in the emergency department, management of symptoms, and likely disposition.   4:06 PM Updated with test results.   5:43 PM I discussed the plan for discharge with the patient or family and they are agreeable.. We discussed supportive cares at home and reasons for return to the ER including new or worsening symptoms - all questions and concerns addressed. Patient to be discharged by RN.    At the conclusion of the  "encounter I discussed the results of all of the tests and the disposition. The questions were answered. The patient or family acknowledged understanding and was agreeable with the care plan.     Voice recognition software was used in the creation of this note. Any grammatical or nonsensical errors are due to inherent errors with the software and are not the intention of the writer.     MEDICATIONS GIVEN IN THE EMERGENCY:  Medications   ondansetron (ZOFRAN) injection 4 mg (4 mg Intravenous $Given 10/21/24 1450)   iopamidol (ISOVUE-370) solution 90 mL (90 mLs Intravenous $Given 10/21/24 1455)   famotidine (PEPCID) injection 20 mg (20 mg Intravenous $Given 10/21/24 1650)       NEW PRESCRIPTIONS STARTED AT TODAY'S ER VISIT  New Prescriptions    BLOOD GLUCOSE (NO BRAND SPECIFIED) TEST STRIP    Use to test blood sugar 4 times daily or as directed.    BLOOD GLUCOSE MONITORING (NO BRAND SPECIFIED) METER DEVICE KIT    Use to test blood sugar 4 times daily or as directed.    FAMOTIDINE (PEPCID) 20 MG TABLET    Take 1 tablet (20 mg) by mouth 2 times daily.    ONDANSETRON (ZOFRAN ODT) 4 MG ODT TAB    Take 1 tablet (4 mg) by mouth every 8 hours as needed for nausea or vomiting.            =================================================================    HPI    Patient information was obtained from: Patient    Use of : N/A         Bhargav Head is a 43 year old male with PMH of DM2, HTN, substance use disorder who presents to the ED via walk-in for evaluation of nausea/vomiting.     The patient reports that 3 days ago, he did not eat very much throughout the day as he had a poor appetite.  Since then, he has had increasing nausea and \"a lot of acid in my stomach.\"  This is causing burning pain in his epigastrium that radiates up into the chest.  He also endorses constipation for the last 3 days.  He is concerned that this could represent a problem with his diabetes, he does not currently have a glucometer so has " "not been checking his sugars at home.  He has been using his long-acting insulin as prescribed but not his mealtime due to his poor appetite.      He also notes that he is tapering up on Suboxone to help stop using fentanyl pills-he started this about 1.5 weeks ago.    He denies any fevers, chest pain, shortness of breath, black or bloody stools.       REVIEW OF SYSTEMS   Review of Systems   Constitutional:  Negative for chills and fever.   Respiratory:  Negative for shortness of breath.    Cardiovascular:  Negative for chest pain.   Gastrointestinal:  Positive for abdominal pain, constipation and nausea. Negative for blood in stool and vomiting.       All other systems reviewed and are negative unless noted in HPI.      PAST MEDICAL HISTORY:  Past Medical History:   Diagnosis Date    Diabetes mellitus (H)        PAST SURGICAL HISTORY:  No past surgical history on file.    CURRENT MEDICATIONS:    blood glucose (NO BRAND SPECIFIED) test strip  blood glucose monitoring (NO BRAND SPECIFIED) meter device kit  famotidine (PEPCID) 20 MG tablet  ondansetron (ZOFRAN ODT) 4 MG ODT tab  amoxicillin-clavulanate (AUGMENTIN) 875-125 MG tablet  DULoxetine (CYMBALTA) 60 MG capsule  HYDROcodone-acetaminophen (NORCO) 5-325 MG per tablet  ibuprofen (ADVIL/MOTRIN) 600 MG tablet  Insulin Aspart (NOVOLOG SC)  insulin glargine (LANTUS) SOLN 100 UNIT/ML  oxyCODONE-acetaminophen (PERCOCET) 5-325 MG per tablet        ALLERGIES:  Allergies   Allergen Reactions    Compazine     Phenothiazines Anxiety and Other (See Comments)     Other reaction(s): Other - Describe In Comment Field   \"anxiety\"    \"increased my heart rate\"   \"anxiety\"     \"increased my heart rate\"    \"anxiety\"     \"anxiety\"       FAMILY HISTORY:  No family history on file.    SOCIAL HISTORY:   Social History     Socioeconomic History    Marital status: Single     Social Determinants of Health     Financial Resource Strain: Declined (6/13/2024)    Received from [facility]    " "Financial Resource Strain     Financial Resource Strain: 99   Food Insecurity: Declined (2024)    Received from [facility]    Food Insecurity     Food: 99   Transportation Needs: Declined (2024)    Received from [facility]    Transportation Needs     Transportation: 99   Physical Activity: Not on File (2023)    Received from [facility]    Physical Activity     Physical Activity: 0   Stress: Not on File (2023)    Received from [facility]    Stress     Stress: 0   Social Connections: Not on File (2024)    Received from [facility]    Social Connections     Connectedness: 0   Housing Stability: Declined (2024)    Received from [facility]    Housing Stability     Housin       VITALS:  Patient Vitals for the past 24 hrs:   BP Temp Temp src Pulse Resp SpO2 Height Weight   10/21/24 1741 (!) 177/89 -- -- 80 -- 95 % -- --   10/21/24 1726 (!) 175/87 -- -- 78 -- 98 % -- --   10/21/24 1711 (!) 179/89 -- -- 80 -- 95 % -- --   10/21/24 1656 (!) 176/87 -- -- 78 -- 96 % -- --   10/21/24 1641 (!) 184/90 -- -- 76 -- 96 % -- --   10/21/24 1609 (!) 184/103 -- -- 80 -- 99 % -- --   10/21/24 1153 (!) 186/104 97.8  F (36.6  C) Oral 81 18 99 % 1.88 m (6' 2\") (!) 150.6 kg (332 lb)       PHYSICAL EXAM    VITAL SIGNS: BP (!) 177/89   Pulse 80   Temp 97.8  F (36.6  C) (Oral)   Resp 18   Ht 1.88 m (6' 2\")   Wt (!) 150.6 kg (332 lb)   SpO2 95%   BMI 42.63 kg/m    General Appearance: Uncomfortable appearing; Alert, cooperative, normal speech and facial symmetry, appears stated age  Head:  Normocephalic, without obvious abnormality, atraumatic  Cardio:  Regular rate and rhythm, S1 and S2 normal, no murmur, rub    or gallop, 2+ pulses symmetric in all extremities  Pulm:  Clear to auscultation bilaterally, respirations unlabored with no accessory muscle use  Abdomen:  Active bowel sounds in all quadrants; abdomen is soft, non-distended with no tenderness to palpation, rebound tenderness, or guarding.   Extremities: Moves " all extremities  Skin: Skin is warm and dry, no rashes or wounds  Neuro: Patient is awake, alert, and responsive to voice. No gross motor weaknesses or sensory loss; moves all extremities.    LAB:  All pertinent labs reviewed and interpreted.  Labs Ordered and Resulted from Time of ED Arrival to Time of ED Departure   COMPREHENSIVE METABOLIC PANEL - Abnormal       Result Value    Sodium 136      Potassium 3.5      Carbon Dioxide (CO2) 28      Anion Gap 11      Urea Nitrogen 17.7      Creatinine 0.90      GFR Estimate >90      Calcium 9.5      Chloride 97 (*)     Glucose 279 (*)     Alkaline Phosphatase 115      AST 16      ALT 23      Protein Total 7.8      Albumin 4.4      Bilirubin Total 0.6     LIPASE - Abnormal    Lipase 12 (*)    CBC WITH PLATELETS AND DIFFERENTIAL - Abnormal    WBC Count 9.1      RBC Count 5.97 (*)     Hemoglobin 17.2      Hematocrit 51.0      MCV 85      MCH 28.8      MCHC 33.7      RDW 12.1      Platelet Count 271      % Neutrophils 77      % Lymphocytes 15      % Monocytes 6      % Eosinophils 1      % Basophils 1      % Immature Granulocytes 0      NRBCs per 100 WBC 0      Absolute Neutrophils 7.0      Absolute Lymphocytes 1.3      Absolute Monocytes 0.5      Absolute Eosinophils 0.1      Absolute Basophils 0.1      Absolute Immature Granulocytes 0.0      Absolute NRBCs 0.0     TROPONIN T, HIGH SENSITIVITY - Normal    Troponin T, High Sensitivity 9     MAGNESIUM - Normal    Magnesium 1.9     TROPONIN T, HIGH SENSITIVITY - Normal    Troponin T, High Sensitivity 8     ROUTINE UA WITH MICROSCOPIC REFLEX TO CULTURE       RADIOLOGY:  Reviewed all pertinent imaging. Please see official radiology report.  CT Abdomen Pelvis w Contrast   Final Result   IMPRESSION:    No acute abnormality or other explanation for symptoms.          EKG:    Performed at: 10/21/24 13:02    I have independently reviewed and interpreted the EKG, along with the final read. EKG also reviewed by Dr. Garcia  Evens.    Ventricular rate 81 bpm  AZ interval 138 ms  QRS duration 78 ms  QT/QTc 360/418 ms  P-R-T axes 52 44 51    Impression: NSR        Abbie Russell PA-C  Emergency Medicine  [facility] EMERGENCY DEPARTMENT  [facility] [facility] [address]  [phone]  Dept: [phone]       Abbie Russell PA-C  10/21/24 3575

## 2024-10-25 LAB
ATRIAL RATE - MUSE: 81 BPM
DIASTOLIC BLOOD PRESSURE - MUSE: NORMAL MMHG
INTERPRETATION ECG - MUSE: NORMAL
P AXIS - MUSE: 52 DEGREES
PR INTERVAL - MUSE: 138 MS
QRS DURATION - MUSE: 78 MS
QT - MUSE: 360 MS
QTC - MUSE: 418 MS
R AXIS - MUSE: 44 DEGREES
SYSTOLIC BLOOD PRESSURE - MUSE: NORMAL MMHG
T AXIS - MUSE: 51 DEGREES
VENTRICULAR RATE- MUSE: 81 BPM